# Patient Record
Sex: MALE | Race: WHITE | NOT HISPANIC OR LATINO | Employment: FULL TIME | URBAN - NONMETROPOLITAN AREA
[De-identification: names, ages, dates, MRNs, and addresses within clinical notes are randomized per-mention and may not be internally consistent; named-entity substitution may affect disease eponyms.]

---

## 2021-08-30 ENCOUNTER — LAB (OUTPATIENT)
Dept: LAB | Facility: HOSPITAL | Age: 51
End: 2021-08-30

## 2021-08-30 ENCOUNTER — TRANSCRIBE ORDERS (OUTPATIENT)
Dept: ADMINISTRATIVE | Facility: HOSPITAL | Age: 51
End: 2021-08-30

## 2021-08-30 DIAGNOSIS — Z11.52 ENCOUNTER FOR SCREENING FOR COVID-19: ICD-10-CM

## 2021-08-30 DIAGNOSIS — Z11.52 ENCOUNTER FOR SCREENING FOR COVID-19: Primary | ICD-10-CM

## 2021-08-30 LAB — SARS-COV-2 RNA NOSE QL NAA+PROBE: NOT DETECTED

## 2021-08-30 PROCEDURE — U0004 COV-19 TEST NON-CDC HGH THRU: HCPCS | Performed by: INTERNAL MEDICINE

## 2021-08-30 PROCEDURE — C9803 HOPD COVID-19 SPEC COLLECT: HCPCS | Performed by: INTERNAL MEDICINE

## 2022-01-05 ENCOUNTER — HOSPITAL ENCOUNTER (OUTPATIENT)
Dept: HOSPITAL 79 - EXRD | Age: 52
End: 2022-01-05
Attending: FAMILY MEDICINE
Payer: COMMERCIAL

## 2022-01-05 DIAGNOSIS — M54.50: Primary | ICD-10-CM

## 2022-01-05 DIAGNOSIS — G89.29: ICD-10-CM

## 2025-07-03 ENCOUNTER — OFFICE VISIT (OUTPATIENT)
Dept: CARDIOLOGY | Facility: CLINIC | Age: 55
End: 2025-07-03
Payer: COMMERCIAL

## 2025-07-03 VITALS
WEIGHT: 170 LBS | DIASTOLIC BLOOD PRESSURE: 76 MMHG | HEART RATE: 85 BPM | HEIGHT: 71 IN | BODY MASS INDEX: 23.8 KG/M2 | SYSTOLIC BLOOD PRESSURE: 107 MMHG | OXYGEN SATURATION: 98 %

## 2025-07-03 DIAGNOSIS — F17.200 TOBACCO USE DISORDER: ICD-10-CM

## 2025-07-03 DIAGNOSIS — R07.9 CHEST PAIN, UNSPECIFIED TYPE: Primary | ICD-10-CM

## 2025-07-03 DIAGNOSIS — R00.2 PALPITATIONS: ICD-10-CM

## 2025-07-03 DIAGNOSIS — E78.5 DYSLIPIDEMIA: ICD-10-CM

## 2025-07-03 PROBLEM — F41.9 ANXIETY: Status: ACTIVE | Noted: 2025-07-03

## 2025-07-03 RX ORDER — SODIUM CHLORIDE 0.9 % (FLUSH) 0.9 %
10 SYRINGE (ML) INJECTION AS NEEDED
OUTPATIENT
Start: 2025-07-03

## 2025-07-03 RX ORDER — SODIUM CHLORIDE 0.9 % (FLUSH) 0.9 %
10 SYRINGE (ML) INJECTION EVERY 12 HOURS SCHEDULED
OUTPATIENT
Start: 2025-07-03

## 2025-07-03 RX ORDER — TAMSULOSIN HYDROCHLORIDE 0.4 MG/1
1 CAPSULE ORAL DAILY
COMMUNITY
Start: 2025-06-28

## 2025-07-03 RX ORDER — METOPROLOL TARTRATE 50 MG
50 TABLET ORAL ONCE
Qty: 1 TABLET | Refills: 0 | Status: SHIPPED | OUTPATIENT
Start: 2025-07-03 | End: 2025-07-03

## 2025-07-03 RX ORDER — SERTRALINE HYDROCHLORIDE 25 MG/1
25 TABLET, FILM COATED ORAL
COMMUNITY
Start: 2025-06-25

## 2025-07-03 RX ORDER — CYANOCOBALAMIN 1000 UG/ML
1000 INJECTION, SOLUTION INTRAMUSCULAR; SUBCUTANEOUS
COMMUNITY
Start: 2025-06-25

## 2025-07-03 RX ORDER — NITROGLYCERIN 0.4 MG/1
0.4 TABLET SUBLINGUAL
OUTPATIENT
Start: 2025-07-03 | End: 2025-07-03

## 2025-07-03 RX ORDER — ERGOCALCIFEROL 1.25 MG/1
CAPSULE, LIQUID FILLED ORAL
COMMUNITY
Start: 2025-06-25

## 2025-07-03 RX ORDER — SODIUM CHLORIDE 9 MG/ML
40 INJECTION, SOLUTION INTRAVENOUS AS NEEDED
OUTPATIENT
Start: 2025-07-03

## 2025-07-03 NOTE — PROGRESS NOTES
Jeannette Marshall APRN Kimberly E Brown, APRN    Alexandro Peoples  1970 07/03/2025    Subjective     Alexandro Peoples is a 54 y.o. male who presents today to [facility] CARDIOLOGY for Establish Care, Palpitations, and Chest Pain.    Palpitations   Associated symptoms include chest pain.   Chest Pain   Associated symptoms include palpitations.   :  History of Present Illness    Mr. Peoples is a 54 years old male who presents today to [facility].  Patient was referred to us by his primary care provider for episodes of palpitations and chest pressure.    Patient reports experiencing heart palpitations, a stabbing sensation in his chest, and pressure in his arm, particularly in the forearm and back of the hand. These symptoms have been present for several weeks. The chest pressure is primarily associated with the palpitations, which he can sometimes hear in his ears. The onset of these symptoms is unpredictable, occurring at various times throughout the day. Initially, the symptoms were infrequent, occurring once a week, but they have since become a daily occurrence. He recalls an incident at work where he experienced a sharp pain and numbness in his arm for approximately 10 minutes, which prompted his wife to suggest a doctor's visit. He also reports feeling lightheaded during these episodes but has never lost consciousness. He does not experience nausea or diaphoresis during these episodes.       His job involves desk work, and he recently started cycling as a form of exercise. He occasionally experiences shortness of breath during physical activity but has not had any major episodes of chest pain while cycling.No  orthopnea or significant leg swellings.    He has a history of anxiety and was recently prescribed Zoloft to manage this condition.    He was diagnosed with hypertension a few years ago but was not prescribed any medication. He has had a few instances of elevated blood  "pressure, one of which resulted in an emergency room visit. He managed to control his blood pressure without medication by making lifestyle changes and reducing stress.    He has vitamin B12 deficiency and vitamin D deficiency.    He smokes one pack of cigarettes per day and has been smoking for approximately 37 years. He consumes alcohol very rarely. There is no known family history of premature heart disease.    SOCIAL HISTORY  Occupations: Works for [employer], which involves mostly desk job.  Exercise: Recently started cycling regularly.  Alcohol: Rarely consumes alcohol.  Tobacco: Smokes 1 pack a day, has been smoking for about 37 years.    FAMILY HISTORY  - Negative for heart disease or heart attacks at an early age       Cardiac risk factors:smoking and hypertriglyceridemia    No Known Allergies:    Current Outpatient Medications:     cyanocobalamin 1000 MCG/ML injection, Inject 1 mL into the appropriate muscle as directed by prescriber Every 30 (Thirty) Days., Disp: , Rfl:     sertraline (ZOLOFT) 25 MG tablet, Take 1 tablet by mouth., Disp: , Rfl:     tamsulosin (FLOMAX) 0.4 MG capsule 24 hr capsule, Take 1 capsule by mouth Daily., Disp: , Rfl:     vitamin D (ERGOCALCIFEROL) 1.25 MG (79417 UT) capsule capsule, , Disp: , Rfl:     metoprolol tartrate (LOPRESSOR) 50 MG tablet, Take 1 tablet by mouth 1 (One) Time for 1 dose. Take 50 mg One (1) Hour Prior to Coronary CTA, Disp: 1 tablet, Rfl: 0    History reviewed. No pertinent past medical history.  History reviewed. No pertinent surgical history.  History reviewed. No pertinent family history.  Social History     Tobacco Use    Smoking status: Every Day     Current packs/day: 1.00     Average packs/day: 1 pack/day for 37.0 years (37.0 ttl pk-yrs)     Types: Cigarettes     Start date: 7/3/1988   Substance Use Topics    Drug use: Never       Objective   Blood pressure 107/76, pulse 85, height 180.3 cm (71\"), weight 77.1 kg (170 lb), SpO2 98%.  Body mass " "index is 23.71 kg/m².    Vitals reviewed.   Constitutional:       Appearance: Not in distress.   Neck:      Vascular: JVD normal.   Pulmonary:      Effort: Pulmonary effort is normal.      Breath sounds: Normal breath sounds.   Cardiovascular:      PMI at left midclavicular line. Normal rate. Regular rhythm. Normal S1. Normal S2.       Murmurs: There is no murmur.      No gallop.  No click. No rub.   Pulses:     Intact distal pulses.   Edema:     Peripheral edema absent.   Skin:     General: Skin is warm and dry.   Neurological:      Mental Status: Alert and oriented to person, place and time.       Physical Exam         DATA:           No results found for: \"BNP\"  No results found for: \"PSA\"   No results found for: \"MG\"  No results found for: \"INR\"  No results found for: \"CKTOTAL\"  No results found for: \"CHOL\", \"CHLPL\"  No results found for: \"TRIG\"  No results found for: \"HDL\"  No results found for: \"LDL\", \"LDLDIRECT\"  No components found for: \"A1C\"      No results found for: \"TSH\"          Invalid input(s): \"LABALBU\", \"PROT\"        Results review: During today's encounter, all relevant clinical data has been reviewed.    Results  Labs 6/5/2025   - LDL cholesterol: 86 mg/dL   - Triglyceride: 225 mg/dL   - A1c:  5.5%   - Hemoglobin: 14.2 g/dL   -TFT normal, TSH 1.61, T4 1.2, T3 2.9          ECG 12 Lead    Date/Time: 7/3/2025 10:02 AM  Performed by: Cary Do MD    Authorized by: Cary Do MD  Previous ECG: no previous ECG available  Rhythm: sinus rhythm  Rate: normal  BPM: 85  QRS axis: normal  Other: no other findings    Clinical impression: normal ECG          Assessment & Plan    Diagnosis Plan   1. Chest pain, unspecified type  CT Angiogram Coronary    nitroglycerin (NITROSTAT) SL tablet 0.4 mg    sodium chloride 0.9 % flush 10 mL    sodium chloride 0.9 % flush 10 mL    sodium chloride 0.9 % infusion 40 mL    Holter Monitor - 72 Hour Up To 15 Days      2. Palpitations        3. Dyslipidemia        4. " Tobacco use disorder          Assessment & Plan  1. Palpitations:  - EKG today shows normal rhythm and heart rate, no signs of ischemia or blockages.  - Thyroid function and hemoglobin levels are normal, ruling out thyroid abnormalities and anemia as causes.  - Provide a 2-week event monitor to record heart rate and rhythm continuously to rule out any underlying arrhythmia as potential etiology.  - Maintain a symptom diary for episodes of palpitations, lightheadedness, or arm discomfort.      2. Chest pressure:  - Could be related to palpitations, but long smoking history necessitates ruling out blockages.  - Order a CT coronary angiogram to examine coronary arteries for major blockages.  - Adjust medication if significant blockages are detected.  - Consider heart catheterization if symptoms persist despite medication.    3. Elevated triglycerides:  - LDL cholesterol level is 86, within the desired range (<100).  - Triglyceride level is 225, exceeding the target range (<150).  - Recommend lifestyle modifications: regular exercise, balanced diet focusing on proteins, vegetables, dry fruits, good fats like fatty fish, and avoiding carbohydrates and animal fats.  Avoid sugary drinks.  - Consider medication if triglyceride levels remain high despite lifestyle changes.    5.  History of hypertension  - Currently has normal blood pressure readings (107/76 mmHg).  - Managed to control blood pressure without medication through lifestyle changes and stress reduction.    6.  Tobacco use disorder  -Smoking cessation counseling was done and patient voiced understanding.     Follow-up  - A follow-up appointment is scheduled in 5 weeks to discuss the results of the tests.    Recommendations  Orders Placed This Encounter   Procedures    CT Angiogram Coronary    No Caffeine or Nicotine 4 Hours Prior to CTA Appointment    Nothing to Eat or Drink 4 Hours Prior to CTA Appointment    Do Not Take Phosphodiasterase Inhibitors in the 72  Hours Prior to Coronary CTA    Holter Monitor - 72 Hour Up To 15 Days    ECG 12 Lead              New Medications:   New Medications Ordered This Visit   Medications    metoprolol tartrate (LOPRESSOR) 50 MG tablet     Sig: Take 1 tablet by mouth 1 (One) Time for 1 dose. Take 50 mg One (1) Hour Prior to Coronary CTA     Dispense:  1 tablet     Refill:  0       Discontinued Medications:   There are no discontinued medications.     Return in about 5 weeks (around 8/7/2025).      Thank you for allowing me to participate in the care of Alexandro Peoples. Feel free to contact me directly with any further questions or concerns.      This document has been electronically signed by Cary Do MD   July 3, 2025 10:12 EDT    Patient or patient representative verbalized consent for the use of Ambient Listening during the visit with  Cary Do MD for chart documentation. 7/3/2025  09:58 EDT    Dictated Utilizing Dragon Dictation: Part of this note may be an electronic transcription/translation of spoken language to printed text using the Dragon Dictation System.

## 2025-07-03 NOTE — LETTER
July 3, 2025     MAHOGANY Aceves  [address]  [address]  [place] [address]    Patient: Alexandro Peoples   YOB: 1970   Date of Visit: 7/3/2025       Dear MAHOGANY Aceves,    Alexandro Peoples was in my office today. Below are the relevant portions of my assessment and plan of care.           If you have questions, please do not hesitate to call me. I look forward to following Alexandro along with you.         Sincerely,        Cary Do MD        CC: No Recipients

## 2025-07-29 ENCOUNTER — TELEPHONE (OUTPATIENT)
Dept: CARDIOLOGY | Facility: CLINIC | Age: 55
End: 2025-07-29
Payer: COMMERCIAL

## 2025-07-29 NOTE — TELEPHONE ENCOUNTER
Received a call from [facility] at [facility] calling for a prior auth for the ct scan.    Stated they never recevied any records for this procedure.   I faxed all reocrds to fax no [phone].  Also spoke with Dr Roper from [facility] asking for records as well.   Confirmed all records have been faxed.       Prior Auth [id number]

## 2025-08-03 ENCOUNTER — HOSPITAL ENCOUNTER (OUTPATIENT)
Dept: CT IMAGING | Facility: HOSPITAL | Age: 55
Discharge: HOME | End: 2025-08-03
Admitting: INTERNAL MEDICINE
Payer: COMMERCIAL

## 2025-08-03 VITALS — DIASTOLIC BLOOD PRESSURE: 65 MMHG | SYSTOLIC BLOOD PRESSURE: 107 MMHG | HEART RATE: 53 BPM

## 2025-08-03 DIAGNOSIS — R07.9 CHEST PAIN, UNSPECIFIED TYPE: ICD-10-CM

## 2025-08-03 PROCEDURE — 75574 CT ANGIO HRT W/3D IMAGE: CPT

## 2025-08-03 PROCEDURE — 25510000001 IOPAMIDOL PER 1 ML: Performed by: INTERNAL MEDICINE

## 2025-08-03 PROCEDURE — 75574 CT ANGIO HRT W/3D IMAGE: CPT | Performed by: RADIOLOGY

## 2025-08-03 RX ORDER — IOPAMIDOL 755 MG/ML
100 INJECTION, SOLUTION INTRAVASCULAR
Status: COMPLETED | OUTPATIENT
Start: 2025-08-03 | End: 2025-08-03

## 2025-08-03 RX ORDER — NITROGLYCERIN 0.4 MG/1
0.4 TABLET SUBLINGUAL
Status: DISCONTINUED | OUTPATIENT
Start: 2025-08-03 | End: 2025-08-04 | Stop reason: HOSPADM

## 2025-08-03 RX ADMIN — NITROGLYCERIN 0.4 MG: 0.4 TABLET, ORALLY DISINTEGRATING SUBLINGUAL at 13:46

## 2025-08-03 RX ADMIN — IOPAMIDOL 90 ML: 755 INJECTION, SOLUTION INTRAVENOUS at 14:04

## 2025-08-07 ENCOUNTER — OFFICE VISIT (OUTPATIENT)
Dept: CARDIOLOGY | Facility: CLINIC | Age: 55
End: 2025-08-07
Payer: COMMERCIAL

## 2025-08-07 VITALS
WEIGHT: 171.4 LBS | DIASTOLIC BLOOD PRESSURE: 76 MMHG | BODY MASS INDEX: 24 KG/M2 | OXYGEN SATURATION: 98 % | SYSTOLIC BLOOD PRESSURE: 114 MMHG | HEIGHT: 71 IN | HEART RATE: 82 BPM

## 2025-08-07 DIAGNOSIS — R07.9 CHEST PAIN, UNSPECIFIED TYPE: Primary | ICD-10-CM

## 2025-08-07 DIAGNOSIS — F17.200 TOBACCO USE DISORDER: ICD-10-CM

## 2025-08-07 DIAGNOSIS — R00.2 PALPITATIONS: ICD-10-CM

## 2025-08-07 PROCEDURE — 99213 OFFICE O/P EST LOW 20 MIN: CPT | Performed by: NURSE PRACTITIONER
